# Patient Record
Sex: MALE | URBAN - NONMETROPOLITAN AREA
[De-identification: names, ages, dates, MRNs, and addresses within clinical notes are randomized per-mention and may not be internally consistent; named-entity substitution may affect disease eponyms.]

---

## 2020-11-17 ENCOUNTER — VIRTUAL VISIT (OUTPATIENT)
Dept: INTERNAL MEDICINE CLINIC | Age: 45
End: 2020-11-17
Payer: MEDICAID

## 2020-11-17 PROCEDURE — 99204 OFFICE O/P NEW MOD 45 MIN: CPT | Performed by: INTERNAL MEDICINE

## 2020-11-17 RX ORDER — HYDROXYZINE HYDROCHLORIDE 25 MG/1
25 TABLET, FILM COATED ORAL EVERY 8 HOURS PRN
Qty: 4 TABLET | Refills: 0 | Status: SHIPPED | OUTPATIENT
Start: 2020-11-17 | End: 2020-12-22

## 2020-11-17 RX ORDER — GABAPENTIN 300 MG/1
300 CAPSULE ORAL 3 TIMES DAILY
Qty: 4 CAPSULE | Refills: 0 | Status: SHIPPED | OUTPATIENT
Start: 2020-11-17 | End: 2020-12-22

## 2020-11-17 RX ORDER — TRAZODONE HYDROCHLORIDE 150 MG/1
150 TABLET ORAL NIGHTLY
Qty: 1 TABLET | Refills: 0 | Status: SHIPPED | OUTPATIENT
Start: 2020-11-17 | End: 2020-12-22

## 2020-11-17 RX ORDER — DICYCLOMINE HYDROCHLORIDE 10 MG/1
10 CAPSULE ORAL 4 TIMES DAILY
Qty: 4 CAPSULE | Refills: 0 | Status: SHIPPED | OUTPATIENT
Start: 2020-11-17 | End: 2020-12-22

## 2020-11-17 NOTE — PROGRESS NOTES
2020    TELEHEALTH EVALUATION -- Audio/Visual (During WGHCL-00 public health emergency)    HPI:  A video conference was done with the patient  Patient was at home, I was in the office  There was no  one else  present during the conference    Misael Ariel (:  1975) has requested an audio/video evaluation for the following concern(s):    HPI  Patient has had problems using heroin/fentanyl  Patient has used it on and off for 18 years  I have seen his girlfriend in the past  Apparently they relapsed a month ago he uses intravenously  Between the 2 of them to use about 1 to 1-1/2 g daily  He was on Suboxone for a short time about 10 years ago  He also smokes marijuana  He says the last time he used was 7 8:00 last night  Social-lives with his girlfriend  He has 2 grown children  Patient is a   Smokes cigarettes  Occasional alcohol  ROS-patient is sweaty sick to his stomach vomiting he does not have any diarrhea  Muscle and joint aches  Anxious and irritable    Prior to Visit Medications    Medication Sig Taking? Authorizing Provider   hydrOXYzine (ATARAX) 25 MG tablet Take 1 tablet by mouth every 8 hours as needed for Itching or Anxiety Yes Arnaud Sosa MD   gabapentin (NEURONTIN) 300 MG capsule Take 1 capsule by mouth 3 times daily for 4 doses. Yes Arnaud Sosa MD   traZODone (DESYREL) 150 MG tablet Take 1 tablet by mouth nightly for 1 dose Yes Arnaud Sosa MD   dicyclomine (BENTYL) 10 MG capsule Take 1 capsule by mouth 4 times daily for 4 doses Yes Arnaud Sosa MD   buprenorphine-naloxone (SUBOXONE) 12-3 MG sublingual film Place 1 Film under the tongue daily for 4 days.   Arnaud Sosa MD       Social History     Tobacco Use    Smoking status: Current Every Day Smoker     Packs/day: 1.00    Smokeless tobacco: Never Used   Substance Use Topics    Alcohol use: Not Currently    Drug use: Yes     Types: Opiates , IV     Comment: last used fentanyl and heroin 11/16/20            PHYSICAL EXAMINATION:  [ INSTRUCTIONS:  \"[x]\" Indicates a positive item  \"[]\" Indicates a negative item  -- DELETE ALL ITEMS NOT EXAMINED]  No vitals done    Constitutional: [x] Appears ill   well-nourished [] No apparent distress      [] Abnormal-   Mental status  [x] Alert and awake  [x] Oriented to person/place/time [x]Able to follow commands      Eyes:  EOM    [x]  Normal  [] Abnormal-  Sclera  []  Normal  [] Abnormal -         Discharge []  None visible  [] Abnormal -  Pupils normal           Psychiatric:       [x] Normal Affect [] No Hallucinations        [] Abnormal-        Diagnosis Orders   1. Severe opioid use disorder (HCC)  hydrOXYzine (ATARAX) 25 MG tablet    gabapentin (NEURONTIN) 300 MG capsule    traZODone (DESYREL) 150 MG tablet    dicyclomine (BENTYL) 10 MG capsule   2. Opioid withdrawal ([address]           Mateo Harris is a 39 y.o. male being evaluated by a Virtual Visit (video visit) encounter to address concerns as mentioned above. A caregiver was present when appropriate. Due to this being a TeleHealth encounter (During QLZTV-17 public health emergency), evaluation of the following organ systems was limited: Vitals/Constitutional/EENT/Resp/CV/GI//MS/Neuro/Skin/Heme-Lymph-Imm. Pursuant to the emergency declaration under the [address] [place] [address], [address] [place] [address] and the [employer] Act, this Virtual Visit was conducted with patient's (and/or legal guardian's) consent, to reduce the patient's risk of exposure to COVID-19 and provide necessary medical care. The patient (and/or legal guardian) has also been advised to contact this office for worsening conditions or problems, and seek emergency medical treatment and/or call 911 if deemed necessary. Services were provided through a video synchronous discussion virtually to substitute for in-person clinic visit.     I will give him as

## 2020-11-18 ENCOUNTER — OFFICE VISIT (OUTPATIENT)
Dept: INTERNAL MEDICINE CLINIC | Age: 45
End: 2020-11-18
Payer: MEDICAID

## 2020-11-18 VITALS
HEART RATE: 78 BPM | SYSTOLIC BLOOD PRESSURE: 158 MMHG | DIASTOLIC BLOOD PRESSURE: 60 MMHG | TEMPERATURE: 98.1 F | HEIGHT: 75 IN | BODY MASS INDEX: 39.17 KG/M2 | WEIGHT: 315 LBS

## 2020-11-18 LAB
ALCOHOL URINE: ABNORMAL
AMPHETAMINE SCREEN, URINE: ABNORMAL
BARBITURATE SCREEN, URINE: ABNORMAL
BENZODIAZEPINE SCREEN, URINE: ABNORMAL
BUPRENORPHINE URINE: ABNORMAL
COCAINE METABOLITE SCREEN URINE: ABNORMAL
FENTANYL SCREEN, URINE: ABNORMAL
GABAPENTIN SCREEN, URINE: ABNORMAL
MDMA URINE: ABNORMAL
METHADONE SCREEN, URINE: ABNORMAL
METHAMPHETAMINE, URINE: ABNORMAL
OPIATE SCREEN URINE: ABNORMAL
OXYCODONE SCREEN URINE: ABNORMAL
PHENCYCLIDINE SCREEN URINE: ABNORMAL
PROPOXYPHENE SCREEN, URINE: ABNORMAL
SYNTHETIC CANNABINOIDS(K2) SCREEN, URINE: ABNORMAL
THC SCREEN, URINE: ABNORMAL
TRAMADOL SCREEN URINE: ABNORMAL
TRICYCLIC ANTIDEPRESSANTS, UR: ABNORMAL

## 2020-11-18 PROCEDURE — 80305 DRUG TEST PRSMV DIR OPT OBS: CPT | Performed by: INTERNAL MEDICINE

## 2020-11-18 PROCEDURE — 99213 OFFICE O/P EST LOW 20 MIN: CPT | Performed by: INTERNAL MEDICINE

## 2020-11-18 RX ORDER — BUPRENORPHINE AND NALOXONE 4; 1 MG/1; MG/1
1 FILM, SOLUBLE BUCCAL; SUBLINGUAL ONCE
Status: COMPLETED | OUTPATIENT
Start: 2020-11-18 | End: 2020-11-18

## 2020-11-18 RX ADMIN — BUPRENORPHINE AND NALOXONE 1 FILM: 4; 1 FILM, SOLUBLE BUCCAL; SUBLINGUAL at 10:09

## 2020-11-18 NOTE — PROGRESS NOTES
MEDICATION ASSISTED TREATMENT ENCOUNTER    HISTORY OF PRESENT ILLNESS  We did a virtual visit yesterday  Patient has had problems using heroin/fentanyl  Patient has used it on and off for 18 years  I have seen his girlfriend in the past  Apparently they relapsed a month ago he uses intravenously  Between the 2 of them to use about 1 to 1-1/2 g daily  Last use was Sunday around 7 or 8 PM  I gave him medications to help him with withdrawal yesterday including Neurontin trazodone Bentyl and hydroxyzine  ROS he has a drunk feeling but does feel better      -No past medical history on file. No past surgical history on file. PAST PSYCHIATRIC HISTORY: Depression and anxiety    No Known Allergies    Current Outpatient Medications   Medication Sig Dispense Refill    hydrOXYzine (ATARAX) 25 MG tablet Take 1 tablet by mouth every 8 hours as needed for Itching or Anxiety 4 tablet 0    gabapentin (NEURONTIN) 300 MG capsule Take 1 capsule by mouth 3 times daily for 4 doses. 4 capsule 0    traZODone (DESYREL) 150 MG tablet Take 1 tablet by mouth nightly for 1 dose 1 tablet 0    dicyclomine (BENTYL) 10 MG capsule Take 1 capsule by mouth 4 times daily for 4 doses 4 capsule 0    buprenorphine-naloxone (SUBOXONE) 8-2 MG FILM SL film Place 1 Film under the tongue 2 times daily for 7 days. 14 Film 0     No current facility-administered medications for this visit.           SOCIAL     Marital status single but lives with his girlfriend     Children 2 adult children     Employment  at [employer] his mom and dad     Legal issues 2 prison terms 1 year and one was 9 months  Multiple nursing home terms     Tobacco: yes, cigarettes     Alcohol no                 ROS     General: Patient denies fevers, chills ,weight changes, sweats     Psych: No depression, anxiety, suicidal ideation or attempts     Endocrine: No thyroid issues,no neck pain, no galactorrhea, no weight changes     Pulmonary: No shortness of breath, orthopnea, PND     Cardiac: No chest pain,syncope, no history of cardiac issues     GI: No trouble with bowels, no abdominal pain     : No dysuria, nocturia, urgency, frequency     MS: Patient denies bone or joint aches, no myalgias     Neuro: Patient denies headaches, seizures, tremors     Skin: No skin lesions, rashes    PHYSICAL EXAM     Blood pressure (!) 158/60, pulse 78, temperature 98.1 °F (36.7 °C), height 6' 3\" (1.905 m), weight (!) 337 lb (152.9 kg). General: Patient resting comfortably in no acute distress     Mental Status Examination:  Level of consciousness:  within normal limits and awake  Appearance:  well-appearing, in chair, good grooming and good hygiene  Behavior/Motor:  {Normal  Attitude toward examiner:  cooperative and attentive  Speech:  spontaneous and normal volume  Mood: normal  Affect:  appropriate  Thought processes:  linear  Thought content:  Denies homicidal ideations  Suicidal Ideation:  denies suicidal ideation  Delusions:  no evidence of delusions  Perceptual Disturbance:  denies any perceptual disturbance  Cognition:  oriented to person, place, and time    Insight : good  Judgment: good  Medication Side Effects:none       Eyes: Pupils are normal         Skin: No rashes, lesions or abnormalities noted        URINE DRUG SCREEN TODAY:  No results for input(s): ALCOHOL, LABAMPH, LABBARB, LABBENZ, BUPRENUR, COCAIMETSCRU, FENTSCRUR, GABAPENTIN, MDMA, METAMPU, LABMETH, OPIATESCREENURINE, OXTCOSU, PHENCYCLIDINESCREENURINE, PROPOXYPHENE, SPICEUR, THCSCREENUR, TRAMADOLUR, TRICYUR in the last 72 hours. Diagnosis Orders   1. Severe opioid use disorder (HCC)  POCT Rapid Drug Screen    buprenorphine-naloxone (SUBOXONE) 4-1 MG SL film 1 Film   2. Opioid withdrawal ([address]     3. Polysubstance abuse ([facility] [address]     4.  Encounter for monitoring Suboxone maintenance therapy           PLAN:  Provider provided

## 2020-11-19 ENCOUNTER — OFFICE VISIT (OUTPATIENT)
Dept: INTERNAL MEDICINE CLINIC | Age: 45
End: 2020-11-19
Payer: MEDICAID

## 2020-11-19 VITALS
WEIGHT: 315 LBS | HEART RATE: 77 BPM | DIASTOLIC BLOOD PRESSURE: 77 MMHG | HEIGHT: 75 IN | TEMPERATURE: 97.3 F | BODY MASS INDEX: 39.17 KG/M2 | SYSTOLIC BLOOD PRESSURE: 136 MMHG

## 2020-11-19 PROCEDURE — 99213 OFFICE O/P EST LOW 20 MIN: CPT | Performed by: INTERNAL MEDICINE

## 2020-11-19 PROCEDURE — 80305 DRUG TEST PRSMV DIR OPT OBS: CPT | Performed by: INTERNAL MEDICINE

## 2020-11-19 RX ORDER — BUPRENORPHINE AND NALOXONE 12; 3 MG/1; MG/1
1 FILM, SOLUBLE BUCCAL; SUBLINGUAL DAILY
Qty: 4 FILM | Refills: 0 | Status: SHIPPED | OUTPATIENT
Start: 2020-11-19 | End: 2020-11-23

## 2020-11-19 RX ORDER — BUPRENORPHINE AND NALOXONE 4; 1 MG/1; MG/1
1 FILM, SOLUBLE BUCCAL; SUBLINGUAL 2 TIMES DAILY
Qty: 8 FILM | Refills: 0 | Status: CANCELLED | OUTPATIENT
Start: 2020-11-19 | End: 2020-11-23

## 2020-11-19 RX ORDER — BUPRENORPHINE AND NALOXONE 12; 3 MG/1; MG/1
FILM, SOLUBLE BUCCAL; SUBLINGUAL
Qty: 4 FILM | Refills: 0 | Status: CANCELLED | OUTPATIENT
Start: 2020-11-19 | End: 2020-11-23

## 2020-11-19 NOTE — PROGRESS NOTES
MEDICATION ASSISTED TREATMENT ENCOUNTER    HISTORY OF PRESENT ILLNESS  We did a virtual visit 11/17  I gave him as needed medications to help him get through withdrawal from opiates  I last saw him here 11/18    Yesterday we started him on Suboxone  I gave him 4 mg twice daily to go home with  He is still having some cravings and withdrawal symptoms  Patient has had problems using heroin/fentanyl  Patient has used it on and off for 18 years  I have seen his girlfriend in the past  Apparently they relapsed a month ago he uses intravenously  Between the 2 of them to use about 1 to 1-1/2 g daily  Last use was Sunday around 7 or 8 PM  I gave him medications to help him with withdrawal yesterday including Neurontin trazodone Bentyl and hydroxyzine  ROS he has a drunk feeling but does feel better      -No past medical history on file. No past surgical history on file. PAST PSYCHIATRIC HISTORY: Depression and anxiety    No Known Allergies    Current Outpatient Medications   Medication Sig Dispense Refill    hydrOXYzine (ATARAX) 25 MG tablet Take 1 tablet by mouth every 8 hours as needed for Itching or Anxiety 4 tablet 0    gabapentin (NEURONTIN) 300 MG capsule Take 1 capsule by mouth 3 times daily for 4 doses. 4 capsule 0    buprenorphine-naloxone (SUBOXONE) 8-2 MG FILM SL film Place 1 Film under the tongue 2 times daily for 7 days. 14 Film 0    traZODone (DESYREL) 150 MG tablet Take 1 tablet by mouth nightly for 1 dose 1 tablet 0    dicyclomine (BENTYL) 10 MG capsule Take 1 capsule by mouth 4 times daily for 4 doses 4 capsule 0     No current facility-administered medications for this visit. ROS     General:   PHYSICAL EXAM     Blood pressure 136/77, pulse 77, temperature 97.3 °F (36.3 °C), height 6' 3\" (1.905 m), weight (!) 343 lb (155.6 kg).              General: Patient resting comfortably in no acute distress Mental Status Examination:  Level of consciousness:  within normal limits and awake  Appearance:  well-appearing, in chair, good grooming and good hygiene  Behavior/Motor:  {Normal  Attitude toward examiner:  cooperative and attentive  Speech:  spontaneous and normal volume  Mood: normal  Affect:  appropriate  Thought processes:  linear  Thought content:  Denies homicidal ideations  Suicidal Ideation:  denies suicidal ideation  Delusions:  no evidence of delusions  Perceptual Disturbance:  denies any perceptual disturbance  Cognition:  oriented to person, place, and time    Insight : good  Judgment: good  Medication Side Effects:none       Eyes: Pupils are normal         Skin: No rashes, lesions or abnormalities noted        URINE DRUG SCREEN TODAY:  Alcohol, Urine  11/19/2020 10:15 AM  Unknown    NEG    Amphetamine Screen, Urine  11/19/2020 10:15 AM  Unknown    NEG    Barbiturate Screen, Urine  11/19/2020 10:15 AM  Unknown    NEG    Benzodiazepine Screen, Urine  11/19/2020 10:15 AM  Unknown    POS    Buprenorphine Urine  11/19/2020 10:15 AM  Unknown    POS    Cocaine Metabolite Screen, Urine  11/19/2020 10:15 AM  Unknown    NEG    FENTANYL SCREEN, URINE  11/19/2020 10:15 AM  Unknown    POS    Gabapentin Screen, Urine  11/19/2020 10:15 AM  Unknown    N/A    MDMA, Urine  11/19/2020 10:15 AM  Unknown    NEG    Methadone Screen, Urine  11/19/2020 10:15 AM  Unknown    NEG    Methamphetamine, Urine  11/19/2020 10:15 AM  Unknown    NEG    Opiate Scrn, Ur  11/19/2020 10:15 AM  Unknown    NEG    Oxycodone Screen, Ur  11/19/2020 10:15 AM  Unknown    NEG    PCP Screen, Urine  11/19/2020 10:15 AM  Unknown    NEG    Propoxyphene Screen, Urine  11/19/2020 10:15 AM  Unknown    N/A    Synthetic Cannabinoids (K2) Screen, Urine  11/19/2020 10:15 AM  Unknown    NEG    THC Screen, Urine  11/19/2020 10:15 AM  Unknown    POS    Tramadol Scrn, Ur  11/19/2020 10:15 AM  Unknown    NEG    Tricyclic Antidepressants, Urine  11/19/2020 10:15 AM  Unknown    N/A           Diagnosis Orders   1. Severe opioid use disorder (HCC)  POCT Rapid Drug Screen    buprenorphine-naloxone (SUBOXONE) 12-3 MG sublingual film   2. Polysubstance abuse ([facility] [address]     3.  Encounter for monitoring Suboxone maintenance therapy           PLAN:  Urine has benzos  He does not know where those came from  Still positive for fentanyl  We will continue Suboxone  I am treating his girlfriend as well  Follow-up 4 days  I reviewed the [place] Automated Rx Reporting System report     There does not appear to be any discrepancies or overprescribing of controlled substances

## 2020-11-19 NOTE — PROGRESS NOTES
Verbal order per Dr. Jossie Cortés for urine drug screen. Positive for BZO BUP THC FENT. Verified results with Roxanne Aponte. Dr. Jossie Cortés ordered Suboxone 12mg film daily for patient. Verified dose with patient. Patient was sent home with 4 day script of Suboxone 12mg film daily and will be seen back in the office 11/23/20.

## 2020-11-23 ENCOUNTER — OFFICE VISIT (OUTPATIENT)
Dept: INTERNAL MEDICINE CLINIC | Age: 45
End: 2020-11-23
Payer: MEDICAID

## 2020-11-23 VITALS
SYSTOLIC BLOOD PRESSURE: 138 MMHG | WEIGHT: 315 LBS | DIASTOLIC BLOOD PRESSURE: 80 MMHG | BODY MASS INDEX: 39.17 KG/M2 | HEIGHT: 75 IN | HEART RATE: 72 BPM | TEMPERATURE: 97 F

## 2020-11-23 PROCEDURE — 80305 DRUG TEST PRSMV DIR OPT OBS: CPT | Performed by: INTERNAL MEDICINE

## 2020-11-23 PROCEDURE — 99213 OFFICE O/P EST LOW 20 MIN: CPT | Performed by: INTERNAL MEDICINE

## 2020-11-23 RX ORDER — BUPRENORPHINE AND NALOXONE 8; 2 MG/1; MG/1
1 FILM, SOLUBLE BUCCAL; SUBLINGUAL 2 TIMES DAILY
Qty: 14 FILM | Refills: 0 | Status: SHIPPED | OUTPATIENT
Start: 2020-11-23 | End: 2020-11-30 | Stop reason: SDUPTHER

## 2020-11-23 RX ORDER — BUPRENORPHINE AND NALOXONE 12; 3 MG/1; MG/1
1 FILM, SOLUBLE BUCCAL; SUBLINGUAL DAILY
Qty: 7 FILM | Refills: 0 | Status: CANCELLED | OUTPATIENT
Start: 2020-11-23 | End: 2020-11-30

## 2020-11-23 NOTE — PROGRESS NOTES
NEG   LABBARB NEG   LABBENZ POS   BUPRENUR POS   COCAIMETSCRU NEG   FENTSCRUR POS   GABAPENTIN N/A   MDMA NEG   METAMPU NEG   LABMETH NEG   OPIATESCREENURINE NEG   OXTCOSU NEG   PHENCYCLIDINESCREENURINE NEG   PROPOXYPHENE N/A   SPICEUR NEG   THCSCREENUR POS   TRAMADOLUR NEG   TRICYUR N/A           Diagnosis Orders   1. Severe opioid use disorder (HCC)  POCT Rapid Drug Screen    buprenorphine-naloxone (SUBOXONE) 8-2 MG FILM SL film   2. Polysubstance abuse ([address]     3.  Encounter for monitoring Suboxone maintenance therapy           PLAN: Urine still has fentanyl benzos along with buprenorphine and THC  Patient said it must of been in the fentanyl because he does not use benzos  Still with urges and cravings  We will increase buprenorphine to 8 mg twice daily  Follow-up 1 week

## 2020-11-30 ENCOUNTER — OFFICE VISIT (OUTPATIENT)
Dept: INTERNAL MEDICINE CLINIC | Age: 45
End: 2020-11-30
Payer: MEDICAID

## 2020-11-30 VITALS
SYSTOLIC BLOOD PRESSURE: 133 MMHG | HEART RATE: 63 BPM | TEMPERATURE: 98.1 F | DIASTOLIC BLOOD PRESSURE: 81 MMHG | BODY MASS INDEX: 38.36 KG/M2 | WEIGHT: 315 LBS | HEIGHT: 76 IN

## 2020-11-30 PROCEDURE — 80305 DRUG TEST PRSMV DIR OPT OBS: CPT | Performed by: INTERNAL MEDICINE

## 2020-11-30 PROCEDURE — 99213 OFFICE O/P EST LOW 20 MIN: CPT | Performed by: INTERNAL MEDICINE

## 2020-11-30 RX ORDER — BUPRENORPHINE AND NALOXONE 8; 2 MG/1; MG/1
1 FILM, SOLUBLE BUCCAL; SUBLINGUAL 2 TIMES DAILY
Qty: 14 FILM | Refills: 0 | Status: SHIPPED | OUTPATIENT
Start: 2020-11-30 | End: 2020-12-07

## 2020-11-30 NOTE — PROGRESS NOTES
Verbal order per Dr. Simon Faulkner for urine drug screen. Positive for BUP THC. Verified results with Taj Nelson. Dr. Simon Faulkner ordered Suboxone 8mg film BID for patient. Verified dose with patient. Patient was sent home with 1 week script of Suboxone 8mg film BID and will be seen back in the office 12/7/20.

## 2020-11-30 NOTE — PROGRESS NOTES
MEDICATION ASSISTED TREATMENT ENCOUNTER    HISTORY OF PRESENT ILLNESS  I last saw him 11/23  I had seen them on video couple days ago and gave him medications to help him get through withdrawal  Then we started him on Suboxone  I gave him 4 mg twice daily to go home with  He was still having some cravings and withdrawal symptoms  I have given him 16 mg Suboxone over the weekend    Patient has had problems using heroin/fentanyl  Patient has used it on and off for 18 years  I have seen his girlfriend in the past  Apparently they relapsed a month ago he uses intravenously  Between the 2 of them to use about 1 to 1-1/2 g daily    ROS Patient is feeling well  Patient is not experiencing  withdrawal symptoms ,no urges or cravings  Patient is not having any side effects from the buprenorphine                           ROS     General: Still with urges and cravings  PHYSICAL EXAM     Blood pressure 133/81, pulse 63, temperature 98.1 °F (36.7 °C), height 6' 4\" (1.93 m), weight (!) 351 lb (159.2 kg).              General: Patient resting comfortably in no acute distress     Mental Status Examination:  Level of consciousness:  within normal limits and awake  Appearance:  well-appearing, in chair, good grooming and good hygiene  Behavior/Motor:  {Normal  Attitude toward examiner:  cooperative and attentive  Speech:  spontaneous and normal volume  Mood: normal  Affect:  appropriate  Thought processes:  linear  Thought content:  Denies homicidal ideations  Suicidal Ideation:  denies suicidal ideation  Delusions:  no evidence of delusions  Perceptual Disturbance:  denies any perceptual disturbance  Cognition:  oriented to person, place, and time    Insight : good  Judgment: good  Medication Side Effects:none       Eyes: Pupils are normal         Skin: No rashes, lesions or abnormalities noted        URINE DRUG SCREEN TODAY:  Alcohol, Urine  11/30/2020  1:56 PM Unknown    NEG    Amphetamine Screen, Urine  11/30/2020  1:56 PM  Unknown    NEG    Barbiturate Screen, Urine  11/30/2020  1:56 PM  Unknown    NEG    Benzodiazepine Screen, Urine  11/30/2020  1:56 PM  Unknown    NEG    Buprenorphine Urine  11/30/2020  1:56 PM  Unknown    POS    Cocaine Metabolite Screen, Urine  11/30/2020  1:56 PM  Unknown    NEG    FENTANYL SCREEN, URINE  11/30/2020  1:56 PM  Unknown    NEG    Gabapentin Screen, Urine  11/30/2020  1:56 PM  Unknown    N/A    MDMA, Urine  11/30/2020  1:56 PM  Unknown    NEG    Methadone Screen, Urine  11/30/2020  1:56 PM  Unknown    NEG    Methamphetamine, Urine  11/30/2020  1:56 PM  Unknown    NEG    Opiate Scrn, Ur  11/30/2020  1:56 PM  Unknown    NEG    Oxycodone Screen, Ur  11/30/2020  1:56 PM  Unknown    NEG    PCP Screen, Urine  11/30/2020  1:56 PM  Unknown    NEG    Propoxyphene Screen, Urine  11/30/2020  1:56 PM  Unknown    N/A    Synthetic Cannabinoids (K2) Screen, Urine  11/30/2020  1:56 PM  Unknown    NEG    THC Screen, Urine  11/30/2020  1:56 PM  Unknown    POS    Tramadol Scrn, Ur  11/30/2020  1:56 PM  Unknown    NEG    Tricyclic Antidepressants, Urine  11/30/2020  1:56 PM  Unknown    N/A           Diagnosis Orders   1. Severe opioid use disorder (HCC)  POCT Rapid Drug Screen    buprenorphine-naloxone (SUBOXONE) 8-2 MG FILM SL film   2. Ejaculatory disorder  Kayleigh Rodriguez MD, Urology, [facility]   3. Polysubstance abuse ([facility] [address]     4.  Encounter for monitoring Suboxone maintenance therapy           PLAN:   Urine now only has buprenorphine and THC  We will send comprehensive urine today  Continue Suboxone 8 mg twice daily  Follow-up here 1 week  He said he has been having a rather bizarre problem  He says almost every time he has a bowel movement he ejaculates  He says he does not have the feelings of an orgasm but definitely he ejaculates  Will refer to urology

## 2020-12-08 ENCOUNTER — OFFICE VISIT (OUTPATIENT)
Dept: INTERNAL MEDICINE CLINIC | Age: 45
End: 2020-12-08
Payer: COMMERCIAL

## 2020-12-08 VITALS
HEIGHT: 75 IN | HEART RATE: 67 BPM | BODY MASS INDEX: 39.17 KG/M2 | SYSTOLIC BLOOD PRESSURE: 133 MMHG | TEMPERATURE: 97.4 F | DIASTOLIC BLOOD PRESSURE: 79 MMHG | WEIGHT: 315 LBS

## 2020-12-08 PROCEDURE — G8428 CUR MEDS NOT DOCUMENT: HCPCS | Performed by: INTERNAL MEDICINE

## 2020-12-08 PROCEDURE — 4004F PT TOBACCO SCREEN RCVD TLK: CPT | Performed by: INTERNAL MEDICINE

## 2020-12-08 PROCEDURE — G8417 CALC BMI ABV UP PARAM F/U: HCPCS | Performed by: INTERNAL MEDICINE

## 2020-12-08 PROCEDURE — 80305 DRUG TEST PRSMV DIR OPT OBS: CPT | Performed by: INTERNAL MEDICINE

## 2020-12-08 PROCEDURE — 99213 OFFICE O/P EST LOW 20 MIN: CPT | Performed by: INTERNAL MEDICINE

## 2020-12-08 PROCEDURE — G8484 FLU IMMUNIZE NO ADMIN: HCPCS | Performed by: INTERNAL MEDICINE

## 2020-12-08 RX ORDER — BUPRENORPHINE AND NALOXONE 8; 2 MG/1; MG/1
1 FILM, SOLUBLE BUCCAL; SUBLINGUAL 2 TIMES DAILY
Qty: 28 FILM | Refills: 0 | Status: SHIPPED | OUTPATIENT
Start: 2020-12-08 | End: 2020-12-22 | Stop reason: SDUPTHER

## 2020-12-08 NOTE — PROGRESS NOTES
MEDICATION ASSISTED TREATMENT ENCOUNTER    HISTORY OF PRESENT ILLNESS  I last saw him 11/30  Car broke down yesterday so he ran out of Suboxone  He is not feeling well  I had seen them on video  and gave him medications to help him get through withdrawal  Then we started him on Suboxone  I gave him 4 mg twice daily to go home with  He was still having some cravings and withdrawal symptoms  I have given him 16 mg Suboxone over the weekend    Patient has had problems using heroin/fentanyl  Patient has used it on and off for 18 years  I have seen his girlfriend in the past  Apparently they relapsed a month ago he uses intravenously  Between the 2 of them they used about 1 to 1-1/2 g daily    ROS Patient is feeling well  Patient is not experiencing  withdrawal symptoms ,no urges or cravings  Patient is not having any side effects from the buprenorphine                           ROS     General: No urges or cravings  PHYSICAL EXAM     Blood pressure 133/79, pulse 67, temperature 97.4 °F (36.3 °C), height 6' 3\" (1.905 m), weight (!) 346 lb (156.9 kg).              General: Patient resting comfortably in no acute distress     Mental Status Examination:  Level of consciousness:  within normal limits and awake  Appearance:  well-appearing, in chair, good grooming and good hygiene  Behavior/Motor:  {Normal  Attitude toward examiner:  cooperative and attentive  Speech:  spontaneous and normal volume  Mood: normal  Affect:  appropriate  Thought processes:  linear  Thought content:  Denies homicidal ideations  Suicidal Ideation:  denies suicidal ideation  Delusions:  no evidence of delusions  Perceptual Disturbance:  denies any perceptual disturbance  Cognition:  oriented to person, place, and time    Insight : good  Judgment: good  Medication Side Effects:none       Eyes: Pupils are normal         Skin: No rashes, lesions or abnormalities noted        URINE DRUG SCREEN TODAY:  Alcohol, Urine  12/08/2020 11:11 AM  Unknown    NEG    Amphetamine Screen, Urine  12/08/2020 11:11 AM  Unknown    NEG    Barbiturate Screen, Urine  12/08/2020 11:11 AM  Unknown    NEG    Benzodiazepine Screen, Urine  12/08/2020 11:11 AM  Unknown    NEG    Buprenorphine Urine  12/08/2020 11:11 AM  Unknown    POS    Cocaine Metabolite Screen, Urine  12/08/2020 11:11 AM  Unknown    NEG    FENTANYL SCREEN, URINE  12/08/2020 11:11 AM  Unknown    NEG    Gabapentin Screen, Urine  12/08/2020 11:11 AM  Unknown    N/A    MDMA, Urine  12/08/2020 11:11 AM  Unknown    NEG    Methadone Screen, Urine  12/08/2020 11:11 AM  Unknown    NEG    Methamphetamine, Urine  12/08/2020 11:11 AM  Unknown    NEG    Opiate Scrn, Ur  12/08/2020 11:11 AM  Unknown    NEG    Oxycodone Screen, Ur  12/08/2020 11:11 AM  Unknown    NEG    PCP Screen, Urine  12/08/2020 11:11 AM  Unknown    NEG    Propoxyphene Screen, Urine  12/08/2020 11:11 AM  Unknown    N/A    Synthetic Cannabinoids (K2) Screen, Urine  12/08/2020 11:11 AM  Unknown    NEG    THC Screen, Urine  12/08/2020 11:11 AM  Unknown    POS    Tramadol Scrn, Ur  12/08/2020 11:11 AM  Unknown    NEG    Tricyclic Antidepressants, Urine  12/08/2020 11:11 AM  Unknown    N/A         Diagnosis Orders   1. Severe opioid use disorder (HCC)  POCT Rapid Drug Screen    buprenorphine-naloxone (SUBOXONE) 8-2 MG FILM SL film   2. Ejaculatory disorder     3. Polysubstance abuse ([facility] [address]     4.  Encounter for monitoring Suboxone maintenance therapy           PLAN:   Urine now only has buprenorphine and THC    Continue Suboxone 8 mg twice daily  Follow-up here 2 weeks  He said he has been having a rather bizarre problem  He says almost every time he has a bowel movement he ejaculates  He says he does not have the feelings of an orgasm but definitely he ejaculates  I referred to urology he says he has an appointment coming up

## 2020-12-08 NOTE — PROGRESS NOTES
Verbal order per Dr. Melanie Dawson for urine drug screen. Positive for BUP THC. Verified results with Lolis Beebe RN. Dr. Melanie Dawson ordered Suboxone 8mg film BID for patient. Verified dose with patient. Patient was sent home with 2 week script of Suboxone 8mg film BID and will be seen back in the office 12/22/20.

## 2020-12-22 ENCOUNTER — OFFICE VISIT (OUTPATIENT)
Dept: INTERNAL MEDICINE CLINIC | Age: 45
End: 2020-12-22
Payer: COMMERCIAL

## 2020-12-22 ENCOUNTER — OFFICE VISIT (OUTPATIENT)
Dept: UROLOGY | Age: 45
End: 2020-12-22
Payer: COMMERCIAL

## 2020-12-22 VITALS
HEIGHT: 75 IN | BODY MASS INDEX: 39.17 KG/M2 | SYSTOLIC BLOOD PRESSURE: 137 MMHG | DIASTOLIC BLOOD PRESSURE: 87 MMHG | HEART RATE: 66 BPM | WEIGHT: 315 LBS | TEMPERATURE: 97.2 F

## 2020-12-22 VITALS — TEMPERATURE: 97.5 F | WEIGHT: 315 LBS | HEIGHT: 75 IN | BODY MASS INDEX: 39.17 KG/M2

## 2020-12-22 DIAGNOSIS — Z12.5 PROSTATE CANCER SCREENING: Primary | ICD-10-CM

## 2020-12-22 DIAGNOSIS — N53.19 EJACULATORY DISORDER: ICD-10-CM

## 2020-12-22 DIAGNOSIS — N40.1 BENIGN LOCALIZED PROSTATIC HYPERPLASIA WITH LOWER URINARY TRACT SYMPTOMS (LUTS): ICD-10-CM

## 2020-12-22 PROCEDURE — G8484 FLU IMMUNIZE NO ADMIN: HCPCS | Performed by: INTERNAL MEDICINE

## 2020-12-22 PROCEDURE — G8417 CALC BMI ABV UP PARAM F/U: HCPCS | Performed by: UROLOGY

## 2020-12-22 PROCEDURE — 99213 OFFICE O/P EST LOW 20 MIN: CPT | Performed by: INTERNAL MEDICINE

## 2020-12-22 PROCEDURE — G8427 DOCREV CUR MEDS BY ELIG CLIN: HCPCS | Performed by: UROLOGY

## 2020-12-22 PROCEDURE — 4004F PT TOBACCO SCREEN RCVD TLK: CPT | Performed by: UROLOGY

## 2020-12-22 PROCEDURE — 80305 DRUG TEST PRSMV DIR OPT OBS: CPT | Performed by: INTERNAL MEDICINE

## 2020-12-22 PROCEDURE — 4004F PT TOBACCO SCREEN RCVD TLK: CPT | Performed by: INTERNAL MEDICINE

## 2020-12-22 PROCEDURE — G8484 FLU IMMUNIZE NO ADMIN: HCPCS | Performed by: UROLOGY

## 2020-12-22 PROCEDURE — G8428 CUR MEDS NOT DOCUMENT: HCPCS | Performed by: INTERNAL MEDICINE

## 2020-12-22 PROCEDURE — G8417 CALC BMI ABV UP PARAM F/U: HCPCS | Performed by: INTERNAL MEDICINE

## 2020-12-22 PROCEDURE — 99204 OFFICE O/P NEW MOD 45 MIN: CPT | Performed by: UROLOGY

## 2020-12-22 RX ORDER — BUPRENORPHINE AND NALOXONE 8; 2 MG/1; MG/1
1 FILM, SOLUBLE BUCCAL; SUBLINGUAL 2 TIMES DAILY
Qty: 28 FILM | Refills: 0 | Status: SHIPPED | OUTPATIENT
Start: 2020-12-22 | End: 2021-01-05

## 2020-12-22 RX ORDER — TAMSULOSIN HYDROCHLORIDE 0.4 MG/1
0.4 CAPSULE ORAL DAILY
Qty: 90 CAPSULE | Refills: 3 | Status: SHIPPED | OUTPATIENT
Start: 2020-12-22

## 2020-12-22 NOTE — PROGRESS NOTES
MEDICATION ASSISTED TREATMENT ENCOUNTER    HISTORY OF PRESENT ILLNESS  I last saw him 12/8    I initially had seen them on video  and gave him medications to help him get through withdrawal  Then we started him on Suboxone  I gave him 4 mg twice daily to go home with  He was still having some cravings and withdrawal symptoms  I have given him 16 mg Suboxone over the weekend    Patient has had problems using heroin/fentanyl  Patient has used it on and off for 18 years  I have seen his girlfriend in the past  Apparently they relapsed a month ago he uses intravenously  Between the 2 of them they used about 1 to 1-1/2 g daily    ROS Patient is feeling well  Patient is feeling well  Patient is not experiencing  withdrawal symptoms ,no urges or cravings  Patient is not having any side effects from the buprenorphine                               PHYSICAL EXAM     Blood pressure 137/87, pulse 66, temperature 97.2 °F (36.2 °C), height 6' 3\" (1.905 m), weight (!) 348 lb (157.9 kg).              General: Patient resting comfortably in no acute distress     Mental Status Examination:  Level of consciousness:  within normal limits and awake  Appearance:  well-appearing, in chair, good grooming and good hygiene  Behavior/Motor:  {Normal  Attitude toward examiner:  cooperative and attentive  Speech:  spontaneous and normal volume  Mood: normal  Affect:  appropriate  Thought processes:  linear  Thought content:  Denies homicidal ideations  Suicidal Ideation:  denies suicidal ideation  Delusions:  no evidence of delusions  Perceptual Disturbance:  denies any perceptual disturbance  Cognition:  oriented to person, place, and time    Insight : good  Judgment: good  Medication Side Effects:none       Eyes: Pupils are normal         Skin: No rashes, lesions or abnormalities noted        URINE DRUG SCREEN TODAY:  Alcohol, Urine 12/22/2020  8:37 AM Unknown   NEG Amphetamine Screen, Urine 12/22/2020  8:37 AM Unknown   NEG    Barbiturate Screen, Urine 12/22/2020  8:37 AM Unknown   NEG    Benzodiazepine Screen, Urine 12/22/2020  8:37 AM Unknown   NEG    Buprenorphine Urine 12/22/2020  8:37 AM Unknown   POS    Cocaine Metabolite Screen, Urine 12/22/2020  8:37 AM Unknown   NEG    FENTANYL SCREEN, URINE 12/22/2020  8:37 AM Unknown   NEG    Gabapentin Screen, Urine 12/22/2020  8:37 AM Unknown   N/A    MDMA, Urine 12/22/2020  8:37 AM Unknown   NEG    Methadone Screen, Urine 12/22/2020  8:37 AM Unknown   NEG    Methamphetamine, Urine 12/22/2020  8:37 AM Unknown   NEG    Opiate Scrn, Ur 12/22/2020  8:37 AM Unknown   NEG    Oxycodone Screen, Ur 12/22/2020  8:37 AM Unknown   NEG    PCP Screen, Urine 12/22/2020  8:37 AM Unknown   NEG    Propoxyphene Screen, Urine 12/22/2020  8:37 AM Unknown   N/A    Synthetic Cannabinoids (K2) Screen, Urine 12/22/2020  8:37 AM Unknown   NEG    THC Screen, Urine 12/22/2020  8:37 AM Unknown   POS    Tramadol Scrn, Ur 12/22/2020  8:37 AM Unknown   NEG    Tricyclic Antidepressants, Urine 12/22/2020  8:37 AM Unknown   N/A         Diagnosis Orders   1. Severe opioid use disorder (HCC)  POCT Rapid Drug Screen    buprenorphine-naloxone (SUBOXONE) 8-2 MG FILM SL film   2. Ejaculatory disorder     3. Encounter for monitoring Suboxone maintenance therapy     4.  Polysubstance abuse ([facility] [address]           PLAN:   Urine now only has buprenorphine and THC    Continue Suboxone 8 mg twice daily  Follow-up here 2 weeks  He said he has been having a rather bizarre problem  He says almost every time he has a bowel movement he ejaculates  He says he does not have the feelings of an orgasm but definitely he ejaculates  I referred to urology he says he has an appointment coming up

## 2020-12-22 NOTE — PROGRESS NOTES
Kena Delacruz MD        [address] [facility] [id number]  Dept: [phone]  Dept Fax: [phone]: [address] [address] Urology Office Note -     Patient:  Chelsea Trammell  YOB: 1975  Date: 1/16/2021    The patient is a 39 y.o. male who presents today for evaluation of the following problems: ejaculatory problems  Chief Complaint   Patient presents with    Consultation     Ejaculatory disorder     referred/consultation requested by No primary care provider on file. Cabrera Angry HISTORY OF PRESENT ILLNESS:     Ejaculatory problems   Onset was  Days ago  Overall, the problem(s) are worse. Severity is described as moderate. Associated Symptoms: No dysuria, no gross hematuria. Current Pain Severity: 0    Reports when he is having bowel movements he also ejaculates  Not diabetic. He is able to get erections. He has had a vasectomy in the past.  No recent PSA check. Secondary Diagnosis:    Kidney stones  Reports getting kidney stones in the past and passes them spontaneously . LUTS  He is experiencing dribbling of urinating and hard to empty his bladder. Requested/reviewed records from No primary care provider on file. office and/or outside physician/EMR    (Patient's old records have been requested, reviewed and pertinent findings summarized in today's note.)    Procedures Today: N/A      Last several PSA's:  No results found for: PSA    Last total testosterone:  No results found for: TESTOSTERONE    Urinalysis today:  No results found for this visit on 12/22/20. Last BUN and creatinine:  No results found for: BUN  No results found for: CREATININE      Imaging Reviewed during this Office Visit:   Kena Delacruz MD independently reviewed the images and verified the radiology reports from:    Patient was never admitted.     PAST MEDICAL, FAMILY AND SOCIAL HISTORY: 3. Benign localized prostatic hyperplasia with lower urinary tract symptoms (LUTS)               Plan:       Reassurance given regarding ejaculatory disorder. Likely related to neuropathy with poor diabetes control  Start flomax. Return in six weeks with a PSA check.        Prescriptions Ordered:  Orders Placed This Encounter   Medications    tamsulosin (FLOMAX) 0.4 MG capsule     Sig: Take 1 capsule by mouth daily Take daily     Dispense:  90 capsule     Refill:  3      Orders Placed:  Orders Placed This Encounter   Procedures    PSA Prostatic Specific Antigen     Standing Status:   Future     Standing Expiration Date:   12/22/2021            Geetha Max MD

## 2020-12-22 NOTE — PROGRESS NOTES
Verbal order per Dr. Monico Deras for urine drug screen. Positive for BUP THC . Verified results with Kit Loco RN. Dr. Monico Deras ordered Suboxone 8mg film BID for patient. Verified dose with patient. Patient was sent home with 2 week script of Suboxone 8mg film BID and will be seen back in the office 1/5/21.

## 2021-05-28 ENCOUNTER — OFFICE VISIT (OUTPATIENT)
Dept: INTERNAL MEDICINE CLINIC | Age: 46
End: 2021-05-28
Payer: COMMERCIAL

## 2021-05-28 VITALS
SYSTOLIC BLOOD PRESSURE: 136 MMHG | TEMPERATURE: 98.9 F | WEIGHT: 302 LBS | HEIGHT: 75 IN | HEART RATE: 73 BPM | DIASTOLIC BLOOD PRESSURE: 88 MMHG | BODY MASS INDEX: 37.55 KG/M2

## 2021-05-28 DIAGNOSIS — F11.20 SEVERE OPIOID USE DISORDER (HCC): Primary | ICD-10-CM

## 2021-05-28 PROCEDURE — 4004F PT TOBACCO SCREEN RCVD TLK: CPT | Performed by: NURSE PRACTITIONER

## 2021-05-28 PROCEDURE — 99214 OFFICE O/P EST MOD 30 MIN: CPT | Performed by: NURSE PRACTITIONER

## 2021-05-28 PROCEDURE — G8427 DOCREV CUR MEDS BY ELIG CLIN: HCPCS | Performed by: NURSE PRACTITIONER

## 2021-05-28 PROCEDURE — 80305 DRUG TEST PRSMV DIR OPT OBS: CPT | Performed by: NURSE PRACTITIONER

## 2021-05-28 PROCEDURE — G8417 CALC BMI ABV UP PARAM F/U: HCPCS | Performed by: NURSE PRACTITIONER

## 2021-05-28 RX ORDER — GABAPENTIN 300 MG/1
300 CAPSULE ORAL 4 TIMES DAILY PRN
Qty: 12 CAPSULE | Refills: 0 | Status: SHIPPED | OUTPATIENT
Start: 2021-05-28 | End: 2021-05-31

## 2021-05-28 RX ORDER — BUPRENORPHINE 300 MG/1
300 SOLUTION SUBCUTANEOUS
Qty: 1 SYRINGE | Refills: 1 | Status: SHIPPED | OUTPATIENT
Start: 2021-05-28 | End: 2021-06-27

## 2021-05-28 RX ORDER — BUPRENORPHINE HYDROCHLORIDE AND NALOXONE HYDROCHLORIDE DIHYDRATE 8; 2 MG/1; MG/1
1 TABLET SUBLINGUAL 2 TIMES DAILY
Qty: 6 TABLET | Refills: 0 | Status: SHIPPED | OUTPATIENT
Start: 2021-05-29 | End: 2021-06-01

## 2021-05-28 RX ORDER — DICYCLOMINE HYDROCHLORIDE 10 MG/1
10 CAPSULE ORAL 4 TIMES DAILY
Qty: 10 CAPSULE | Refills: 0 | Status: SHIPPED | OUTPATIENT
Start: 2021-05-28

## 2021-05-28 RX ORDER — HYDROXYZINE HYDROCHLORIDE 25 MG/1
25 TABLET, FILM COATED ORAL EVERY 8 HOURS PRN
Qty: 30 TABLET | Refills: 0 | Status: SHIPPED | OUTPATIENT
Start: 2021-05-28 | End: 2021-06-07

## 2021-05-28 RX ORDER — TRAZODONE HYDROCHLORIDE 50 MG/1
50 TABLET ORAL NIGHTLY
Qty: 10 TABLET | Refills: 0 | Status: SHIPPED | OUTPATIENT
Start: 2021-05-28

## 2021-05-28 NOTE — PROGRESS NOTES
[facility]  [address]  Dept: [phone]  Dept Fax: [phone]: [phone]     Visit Date:  5/28/2021    Patient:  Debi Farr  YOB: 1975    HPI:     Chief Complaint   Patient presents with   [facility] Drug Problem     [place] presents today for medical evaluation of severe opioid use disorder. MAT patient of Dr. Raford Sandhoff. Last seen him 5 months ago. Has relapsed. Is accompanied by his significant other, Roxanna Matos. Urine positive for fentanyl and THC    Last use 16 hours ago. Urges and cravings previously controlled with Suboxone 8 mg BID    He is interested in Sublocade injection    GOAL:  To enhance patient recovery through the use of medication assisted treatment to improve overall quality of life. OBJECTIVE:  Patient will abstain from the use of mood altering substances 7 out of 7 days per week. INTERVENTION:  Patient will be maintained on Sublocade medication and will be linked to counseling, psychiatry and 12 step meetings as applicable. Patient will continue current treatment regiment as outlined and treatment plan will be reviewed at each visit. I reviewed the [place] Automated Rx Reporting System report today    There does not appear to be any discrepancies or overprescribing of controlled substances    Patient is compliant with counseling and meetings    Drug screen results as above    Starting subcutaneous buprenorphine would decrease the psychological dependence on Suboxone as well as improve treatment compliance    Medications    Current Outpatient Medications:     buprenorphine er (SUBLOCADE) 300 MG/1.5ML SOSY injection, Inject 1.5 mLs into the skin every 30 days for 30 days. , Disp: 1 Syringe, Rfl: 1    gabapentin (NEURONTIN) 300 MG capsule, Take 1 capsule by mouth 4 times daily as needed (detox) for up to 3 days. , Disp: 12 capsule, Rfl: 0    dicyclomine (BENTYL) 10 MG capsule, Take 1 capsule by mouth 4 times daily, Disp: 10 capsule, Rfl: 0    hydrOXYzine (ATARAX) 25 MG tablet, Take 1 tablet by mouth every 8 hours as needed for Anxiety, Disp: 30 tablet, Rfl: 0    traZODone (DESYREL) 50 MG tablet, Take 1 tablet by mouth nightly, Disp: 10 tablet, Rfl: 0    tamsulosin (FLOMAX) 0.4 MG capsule, Take 1 capsule by mouth daily Take daily, Disp: 90 capsule, Rfl: 3    The patient has No Known Allergies. Past Medical History  Janell Marquez  has no past medical history on file. Past Surgical History  The patient  has a past surgical history that includes Vasectomy (2002) and knee surgery. Family History  This patient's family history is not on file. Social History  Janell Marquez  reports that he has been smoking. He has been smoking about 1.00 pack per day. He has never used smokeless tobacco. He reports previous alcohol use. He reports current drug use. Drugs: Opiates  and IV. Health Maintenance:    Health Maintenance   Topic Date Due    Hepatitis C screen  Never done    Pneumococcal 0-64 years Vaccine (1 of 2 - PPSV23) Never done    COVID-19 Vaccine (1) Never done    HIV screen  Never done    DTaP/Tdap/Td vaccine (1 - Tdap) Never done    Lipid screen  Never done    Diabetes screen  Never done    Flu vaccine (Season Ended) 09/01/2021    Hepatitis A vaccine  Aged Out    Hepatitis B vaccine  Aged Out    Hib vaccine  Aged Out    Meningococcal (ACWY) vaccine  Aged Out       Subjective:      Review of Systems   Constitutional: Positive for chills, diaphoresis and fatigue. Negative for fever. HENT: Negative for congestion, rhinorrhea, sinus pressure, sinus pain, sore throat, tinnitus and trouble swallowing. Eyes: Negative for photophobia and visual disturbance. Respiratory: Negative for cough, shortness of breath and wheezing. Cardiovascular: Negative for chest pain, palpitations and leg swelling.    Gastrointestinal: Positive

## 2021-05-28 NOTE — PROGRESS NOTES
Verbal order per Lex Kaba CNP for urine drug screen. Positive for FTY THC. Verified results with Jeri Kinney. Lex Kaba CNP ordered Suboxone 8mg tab BID  for patient. Verified dose with patient. Patient was sent home with 3 day script of Suboxone 8mg tab BID and will be seen back in the office 6/1/21.

## 2021-05-28 NOTE — LETTER
[address] [facility] Internal Medicine and Medication Management  [address]  Phone: [phone]  Fax: [phone] [facility], PATRICK - CNP        May 28, 2021     Patient: Tae So   YOB: 1975   Date of Visit: 5/28/2021       To Whom It May Concern: It is my medical opinion that Jeromy Dawn may return to work on 05/29/2021. If you have any questions or concerns, please don't hesitate to call.     Sincerely,        Cammy Castleman, APRN - CNP

## 2021-06-07 ASSESSMENT — ENCOUNTER SYMPTOMS
WHEEZING: 0
SINUS PRESSURE: 0
ABDOMINAL PAIN: 1
SINUS PAIN: 0
PHOTOPHOBIA: 0
NAUSEA: 0
SHORTNESS OF BREATH: 0
TROUBLE SWALLOWING: 0
BLOOD IN STOOL: 0
VOMITING: 0
CONSTIPATION: 0
ABDOMINAL DISTENTION: 0
RHINORRHEA: 0
SORE THROAT: 0
DIARRHEA: 0
COUGH: 0

## 2024-05-09 ENCOUNTER — HOSPITAL ENCOUNTER (EMERGENCY)
Age: 49
Discharge: HOME | End: 2024-05-09
Payer: COMMERCIAL

## 2024-05-09 VITALS
WEIGHT: 315 LBS | OXYGEN SATURATION: 96 % | TEMPERATURE: 97.9 F | SYSTOLIC BLOOD PRESSURE: 131 MMHG | BODY MASS INDEX: 39.17 KG/M2 | RESPIRATION RATE: 16 BRPM | HEART RATE: 56 BPM | DIASTOLIC BLOOD PRESSURE: 71 MMHG | HEIGHT: 75 IN

## 2024-05-09 DIAGNOSIS — R21 RASH AND OTHER NONSPECIFIC SKIN ERUPTION: Primary | ICD-10-CM

## 2024-05-09 PROCEDURE — 99203 OFFICE O/P NEW LOW 30 MIN: CPT

## 2024-05-09 PROCEDURE — 99213 OFFICE O/P EST LOW 20 MIN: CPT | Performed by: EMERGENCY MEDICINE

## 2024-05-09 RX ORDER — PERMETHRIN 50 MG/G
CREAM TOPICAL
Qty: 1 EACH | Refills: 0 | Status: SHIPPED | OUTPATIENT
Start: 2024-05-09

## 2024-05-09 RX ORDER — TRIAMCINOLONE ACETONIDE 1 MG/G
CREAM TOPICAL
Qty: 45 G | Refills: 0 | Status: SHIPPED | OUTPATIENT
Start: 2024-05-09

## 2024-05-09 RX ORDER — METHADONE HYDROCHLORIDE 10 MG/5ML
100 SOLUTION ORAL EVERY 4 HOURS PRN
COMMUNITY

## 2024-05-09 ASSESSMENT — PAIN - FUNCTIONAL ASSESSMENT: PAIN_FUNCTIONAL_ASSESSMENT: NONE - DENIES PAIN

## 2024-05-09 NOTE — DISCHARGE INSTRUCTIONS
Apply permethrin cream from your neck down and wear until bedtime tonight.  Shower off and apply new clothing.  Wash all clothing and bed linen and dry on high heat    You may use triamcinolone cream beginning tomorrow to help with the rash    Return for any new or worsening symptoms

## 2024-05-09 NOTE — ED PROVIDER NOTES
[facility] URGENT CARE  Urgent Care Encounter       CHIEF COMPLAINT       Chief Complaint   Patient presents with    Rash     Arms, legs       Nurses Notes reviewed and I agree except as noted in the HPI.  HISTORY OF PRESENT ILLNESS   Reddy Doss is a 48 y.o. male who presents for rash has been present on the left arm for couple days and now has developed on bilateral lower legs.  Lower legs is itching.  Patient states he has been helping his landlord clean out an empty house.  He states the house is very dirty and disgusting.  He states otherwise, nothing new has occurred.  Denies any other exposures.  No new medications, detergents or soaps    HPI    REVIEW OF SYSTEMS     Review of Systems   Constitutional:  Negative for activity change, fatigue and fever.   Skin:  Positive for rash.       PAST MEDICAL HISTORY   History reviewed. No pertinent past medical history.    SURGICALHISTORY     Patient  has a past surgical history that includes Vasectomy (2002) and knee surgery.    CURRENT MEDICATIONS       Discharge Medication List as of 5/9/2024 11:54 AM        CONTINUE these medications which have NOT CHANGED    Details   methadone 10 MG/5ML solution Take 50 mLs by mouth every 4 hours as needed for Pain. Max Daily Amount: 600 mgHistorical Med             ALLERGIES     Patient is has No Known Allergies.    Patients   There is no immunization history on file for this patient.    FAMILY HISTORY     Patient's family history is not on file.    SOCIAL HISTORY     Patient  reports that he has been smoking cigarettes. He has never used smokeless tobacco. He reports that he does not currently use alcohol. He reports current drug use. Drugs: Opiates  and IV.    PHYSICAL EXAM     ED TRIAGE VITALS  BP: 131/71, Temp: 97.9 °F (36.6 °C), Pulse: 56, Respirations: 16, SpO2: 96 %,Estimated body mass index is 40.62 kg/m² as calculated from the following:    Height as of this encounter: 1.905 m (6' 3\").    Weight as of  (NEURONTIN) 300 MG capsule Comments:   Reason for Stopping:         dicyclomine (BENTYL) 10 MG capsule Comments:   Reason for Stopping:         traZODone (DESYREL) 50 MG tablet Comments:   Reason for Stopping:         tamsulosin (FLOMAX) 0.4 MG capsule Comments:   Reason for Stopping:               Discharge Medication List as of 5/9/2024 11:54 AM          PATRICK Vieira - CNP    (Please note that portions of this note were completed with a voice recognition program. Efforts were made to edit the dictations but occasionally words are mis-transcribed.)           Thomas Chaves, APRN - CNP  05/09/24 1152

## 2025-03-14 ENCOUNTER — HOSPITAL ENCOUNTER (EMERGENCY)
Age: 50
Discharge: HOME | End: 2025-03-14
Payer: COMMERCIAL

## 2025-03-14 VITALS
HEART RATE: 64 BPM | WEIGHT: 315 LBS | OXYGEN SATURATION: 96 % | BODY MASS INDEX: 46.5 KG/M2 | DIASTOLIC BLOOD PRESSURE: 85 MMHG | TEMPERATURE: 97.8 F | RESPIRATION RATE: 16 BRPM | SYSTOLIC BLOOD PRESSURE: 148 MMHG

## 2025-03-14 DIAGNOSIS — J40 SINOBRONCHITIS: Primary | ICD-10-CM

## 2025-03-14 DIAGNOSIS — J32.9 SINOBRONCHITIS: Primary | ICD-10-CM

## 2025-03-14 PROCEDURE — 99213 OFFICE O/P EST LOW 20 MIN: CPT

## 2025-03-14 RX ORDER — ALBUTEROL SULFATE 90 UG/1
2 INHALANT RESPIRATORY (INHALATION) EVERY 4 HOURS PRN
Qty: 18 G | Refills: 0 | Status: SHIPPED | OUTPATIENT
Start: 2025-03-14

## 2025-03-14 RX ORDER — BENZONATATE 100 MG/1
100 CAPSULE ORAL 3 TIMES DAILY PRN
Qty: 21 CAPSULE | Refills: 0 | Status: SHIPPED | OUTPATIENT
Start: 2025-03-14 | End: 2025-03-21

## 2025-03-14 RX ORDER — PREDNISONE 20 MG/1
20 TABLET ORAL 2 TIMES DAILY
Qty: 10 TABLET | Refills: 0 | Status: SHIPPED | OUTPATIENT
Start: 2025-03-14 | End: 2025-03-19

## 2025-03-14 ASSESSMENT — ENCOUNTER SYMPTOMS
SINUS PRESSURE: 1
NAUSEA: 1
SINUS PAIN: 1
COUGH: 1

## 2025-03-14 ASSESSMENT — PAIN - FUNCTIONAL ASSESSMENT: PAIN_FUNCTIONAL_ASSESSMENT: NONE - DENIES PAIN

## 2025-03-14 NOTE — ED TRIAGE NOTES
Reddy arrives to room with complaint of  cough, chest congestion for past 2 weeks, today started with diarrhea, nausea      States he had a tooth that broke on Monday or tue, and had a lot of purulent drainage from it.  Pt concerned this is causing diarrhea and nausea is from that.    Has not taken any meds for symptoms

## 2025-03-14 NOTE — ED PROVIDER NOTES
[facility] URGENT CARE  Urgent Care Encounter       CHIEF COMPLAINT       Chief Complaint   Patient presents with    Chest Congestion       Nurses Notes reviewed and I agree except as noted in the HPI.  HISTORY OF PRESENT ILLNESS   Reddy Doss is a 49 y.o. male who presents with complaints of cough, chest congestion, headache, sinus pressure, green nasal discharge. Pt reports symptoms for 2 weeks. Pt reports nausea and diarrhea from swallowing secretions.     The history is provided by the patient.       REVIEW OF SYSTEMS     Review of Systems   Constitutional:  Negative for fatigue and fever.   HENT:  Positive for congestion, sinus pressure and sinus pain.    Respiratory:  Positive for cough.    Gastrointestinal:  Positive for nausea.   All other systems reviewed and are negative.      PAST MEDICAL HISTORY   History reviewed. No pertinent past medical history.    SURGICALHISTORY     Patient  has a past surgical history that includes Vasectomy (2002) and knee surgery.    CURRENT MEDICATIONS       Previous Medications    METHADONE 10 MG/5ML SOLUTION    Take 50 mLs by mouth every 4 hours as needed for Pain. Max Daily Amount: 600 mg       ALLERGIES     Patient is has no known allergies.    Patients   There is no immunization history on file for this patient.    FAMILY HISTORY     Patient's family history is not on file.    SOCIAL HISTORY     Patient  reports that he has been smoking cigarettes. He has never used smokeless tobacco. He reports that he does not currently use alcohol. He reports current drug use. Drugs: Opiates , IV, and Marijuana (Weed).    PHYSICAL EXAM     ED TRIAGE VITALS  BP: (!) 148/85, Temp: 97.8 °F (36.6 °C), Pulse: 64, Respirations: 16, SpO2: 96 %,Estimated body mass index is 46.5 kg/m² as calculated from the following:    Height as of 5/9/24: 1.905 m (6' 3\").    Weight as of this encounter: 168.7 kg (372 lb).,No LMP for male patient.    Physical Exam  Vitals and nursing note reviewed.